# Patient Record
Sex: FEMALE | Race: WHITE
[De-identification: names, ages, dates, MRNs, and addresses within clinical notes are randomized per-mention and may not be internally consistent; named-entity substitution may affect disease eponyms.]

---

## 2017-04-12 ENCOUNTER — CHARTING TRANS (OUTPATIENT)
Dept: OTHER | Age: 47
End: 2017-04-12

## 2017-08-07 ENCOUNTER — ANESTHESIA EVENT (OUTPATIENT)
Dept: CARDIAC SURGERY | Facility: HOSPITAL | Age: 47
End: 2017-08-07
Payer: COMMERCIAL

## 2017-08-08 ENCOUNTER — HOSPITAL ENCOUNTER (OUTPATIENT)
Facility: HOSPITAL | Age: 47
Discharge: HOME | End: 2017-08-08
Attending: SURGERY | Admitting: SURGERY
Payer: COMMERCIAL

## 2017-08-08 ENCOUNTER — ANESTHESIA (OUTPATIENT)
Dept: CARDIAC SURGERY | Facility: HOSPITAL | Age: 47
End: 2017-08-08
Payer: COMMERCIAL

## 2017-08-08 ENCOUNTER — SURGERY (OUTPATIENT)
Age: 47
End: 2017-08-08

## 2017-08-08 VITALS
SYSTOLIC BLOOD PRESSURE: 94 MMHG | BODY MASS INDEX: 44.9 KG/M2 | HEART RATE: 76 BPM | RESPIRATION RATE: 16 BRPM | WEIGHT: 244 LBS | HEIGHT: 62 IN | OXYGEN SATURATION: 95 % | DIASTOLIC BLOOD PRESSURE: 50 MMHG | TEMPERATURE: 98 F

## 2017-08-08 LAB
APTT PPP: 37.3 SECONDS (ref 25–34)
ATRIAL RATE: 70 BPM
BUN BLD-MCNC: 34 MG/DL (ref 8–20)
CALCIUM BLD-MCNC: 9.7 MG/DL (ref 8.3–10.3)
CHLORIDE: 99 MMOL/L (ref 101–111)
CO2: 30 MMOL/L (ref 22–32)
CREAT BLD-MCNC: 7.27 MG/DL (ref 0.55–1.02)
GLUCOSE BLD-MCNC: 90 MG/DL (ref 70–99)
INR BLD: 1.01 (ref 0.89–1.11)
P AXIS: 36 DEGREES
P-R INTERVAL: 166 MS
POTASSIUM SERPL-SCNC: 4.5 MMOL/L (ref 3.6–5.1)
PSA SERPL DL<=0.01 NG/ML-MCNC: 13.3 SECONDS (ref 12–14.3)
Q-T INTERVAL: 396 MS
QRS DURATION: 84 MS
QTC CALCULATION (BEZET): 427 MS
R AXIS: 9 DEGREES
SODIUM SERPL-SCNC: 138 MMOL/L (ref 136–144)
T AXIS: 42 DEGREES
VENTRICULAR RATE: 70 BPM

## 2017-08-08 PROCEDURE — 93005 ELECTROCARDIOGRAM TRACING: CPT

## 2017-08-08 PROCEDURE — 93010 ELECTROCARDIOGRAM REPORT: CPT | Performed by: INTERNAL MEDICINE

## 2017-08-08 PROCEDURE — 80048 BASIC METABOLIC PNL TOTAL CA: CPT | Performed by: SURGERY

## 2017-08-08 PROCEDURE — 85730 THROMBOPLASTIN TIME PARTIAL: CPT | Performed by: SURGERY

## 2017-08-08 PROCEDURE — 05SF0ZZ REPOSITION LEFT CEPHALIC VEIN, OPEN APPROACH: ICD-10-PCS | Performed by: SURGERY

## 2017-08-08 PROCEDURE — 85610 PROTHROMBIN TIME: CPT | Performed by: SURGERY

## 2017-08-08 RX ORDER — HYDROMORPHONE HYDROCHLORIDE 1 MG/ML
0.4 INJECTION, SOLUTION INTRAMUSCULAR; INTRAVENOUS; SUBCUTANEOUS EVERY 5 MIN PRN
Status: ACTIVE | OUTPATIENT
Start: 2017-08-08 | End: 2017-08-08

## 2017-08-08 RX ORDER — ONDANSETRON 4 MG/1
4 TABLET, ORALLY DISINTEGRATING ORAL EVERY 6 HOURS PRN
Status: DISCONTINUED | OUTPATIENT
Start: 2017-08-08 | End: 2017-08-12

## 2017-08-08 RX ORDER — NALOXONE HYDROCHLORIDE 0.4 MG/ML
80 INJECTION, SOLUTION INTRAMUSCULAR; INTRAVENOUS; SUBCUTANEOUS AS NEEDED
Status: ACTIVE | OUTPATIENT
Start: 2017-08-08 | End: 2017-08-08

## 2017-08-08 RX ORDER — ONDANSETRON 2 MG/ML
INJECTION INTRAMUSCULAR; INTRAVENOUS
Status: COMPLETED
Start: 2017-08-08 | End: 2017-08-08

## 2017-08-08 RX ORDER — MORPHINE SULFATE 2 MG/ML
2 INJECTION, SOLUTION INTRAMUSCULAR; INTRAVENOUS
Status: DISCONTINUED | OUTPATIENT
Start: 2017-08-08 | End: 2017-08-12

## 2017-08-08 RX ORDER — BACITRACIN 50000 [USP'U]/1
INJECTION, POWDER, LYOPHILIZED, FOR SOLUTION INTRAMUSCULAR AS NEEDED
Status: DISCONTINUED | OUTPATIENT
Start: 2017-08-08 | End: 2017-08-08

## 2017-08-08 RX ORDER — ONDANSETRON 2 MG/ML
4 INJECTION INTRAMUSCULAR; INTRAVENOUS EVERY 6 HOURS PRN
Status: DISCONTINUED | OUTPATIENT
Start: 2017-08-08 | End: 2017-08-12

## 2017-08-08 RX ORDER — MORPHINE SULFATE 4 MG/ML
8 INJECTION, SOLUTION INTRAMUSCULAR; INTRAVENOUS
Status: DISCONTINUED | OUTPATIENT
Start: 2017-08-08 | End: 2017-08-12

## 2017-08-08 RX ORDER — HYDROCODONE BITARTRATE AND ACETAMINOPHEN 10; 325 MG/1; MG/1
1 TABLET ORAL EVERY 6 HOURS PRN
Status: DISCONTINUED | OUTPATIENT
Start: 2017-08-08 | End: 2017-08-12

## 2017-08-08 RX ORDER — MORPHINE SULFATE 4 MG/ML
4 INJECTION, SOLUTION INTRAMUSCULAR; INTRAVENOUS
Status: DISCONTINUED | OUTPATIENT
Start: 2017-08-08 | End: 2017-08-12

## 2017-08-08 RX ORDER — CEFAZOLIN SODIUM 1 G/3ML
INJECTION, POWDER, FOR SOLUTION INTRAMUSCULAR; INTRAVENOUS
Status: DISCONTINUED | OUTPATIENT
Start: 2017-08-08 | End: 2017-08-08 | Stop reason: HOSPADM

## 2017-08-08 RX ORDER — HYDROCODONE BITARTRATE AND ACETAMINOPHEN 5; 325 MG/1; MG/1
1 TABLET ORAL AS NEEDED
Status: DISCONTINUED | OUTPATIENT
Start: 2017-08-08 | End: 2017-08-12

## 2017-08-08 RX ORDER — HYDROCODONE BITARTRATE AND ACETAMINOPHEN 10; 325 MG/1; MG/1
2 TABLET ORAL EVERY 6 HOURS PRN
Status: DISCONTINUED | OUTPATIENT
Start: 2017-08-08 | End: 2017-08-12

## 2017-08-08 RX ORDER — HYDROCODONE BITARTRATE AND ACETAMINOPHEN 5; 325 MG/1; MG/1
2 TABLET ORAL AS NEEDED
Status: DISCONTINUED | OUTPATIENT
Start: 2017-08-08 | End: 2017-08-12

## 2017-08-08 RX ORDER — BUPIVACAINE HYDROCHLORIDE 5 MG/ML
INJECTION, SOLUTION PERINEURAL AS NEEDED
Status: DISCONTINUED | OUTPATIENT
Start: 2017-08-08 | End: 2017-08-08 | Stop reason: HOSPADM

## 2017-08-08 RX ORDER — ONDANSETRON 2 MG/ML
4 INJECTION INTRAMUSCULAR; INTRAVENOUS AS NEEDED
Status: ACTIVE | OUTPATIENT
Start: 2017-08-08 | End: 2017-08-08

## 2017-08-08 RX ORDER — SODIUM CHLORIDE, SODIUM LACTATE, POTASSIUM CHLORIDE, CALCIUM CHLORIDE 600; 310; 30; 20 MG/100ML; MG/100ML; MG/100ML; MG/100ML
INJECTION, SOLUTION INTRAVENOUS CONTINUOUS
Status: DISCONTINUED | OUTPATIENT
Start: 2017-08-08 | End: 2017-08-12

## 2017-08-08 RX ORDER — HYDROCODONE BITARTRATE AND ACETAMINOPHEN 10; 325 MG/1; MG/1
1 TABLET ORAL EVERY 4 HOURS PRN
Qty: 60 TABLET | Refills: 0 | Status: SHIPPED | OUTPATIENT
Start: 2017-08-08 | End: 2017-08-18

## 2017-08-08 RX ORDER — ACETAMINOPHEN 500 MG
1000 TABLET ORAL ONCE AS NEEDED
Status: ACTIVE | OUTPATIENT
Start: 2017-08-08 | End: 2017-08-08

## 2017-08-08 NOTE — H&P
[facility]  Vascular Surgery    Zeenat Greene Patient Status:  Outpatient    1970 MRN [id number]   Location [address] [facility] Attending Otilio Meigs, MD   Hosp Day # 0 PCP Zachary Roy       History of Present Illness: bruits  RESPIRATORY: no rales, rhonchi, or wheezes B  CARDIO: RRR without murmur, no murmur, no gallop   ABDOMEN: soft, non-tender with no palpable aneurysm or masses  BACK: normal, no tenderness  SKIN: no rashes, no suspicious lesions, warm and dry  EXTRE

## 2017-08-08 NOTE — ANESTHESIA POSTPROCEDURE EVALUATION
[address] [facility] Patient Status:  Outpatient   Age/Gender 56year old female MRN [id number]   Location 1310 [facility] ER Attending Mac Villalobos MD   Hosp Day # 0 PCP [facility] - SIDNEY [facility]ER[facility]       Anesthesia Post-op Note

## 2017-08-08 NOTE — BRIEF OP NOTE
Pre-Operative Diagnosis: end stage renal disease     Post-Operative Diagnosis: ESRD     Procedure Performed:   Left cephalic vein transposition - upper arm    Surgeon(s) and Role:     Roseline Calzada MD - Primary    Assistant(s):  Surgical Assistant.

## 2017-08-08 NOTE — ANESTHESIA PREPROCEDURE EVALUATION
PRE-OP EVALUATION    Patient Name: Moncho Pace    Pre-op Diagnosis: end stage renal disease    Procedure(s):  left cephalic vein transposition  Brian GILES    Surgeon(s) and Role:     Eric Barboza MD - Primary    Pre-op vitals reviewed.         Body mas regular  Rate: normal     Dental    No notable dental history.          Pulmonary    Pulmonary exam normal.                 Other findings          /60 (BP Location: Right arm)   Pulse 71   Temp (!) 97.5 °F (36.4 °C) (Temporal)   Resp 12   Ht 1.575 m

## 2017-08-18 PROBLEM — N18.6 ESRD ON HEMODIALYSIS (HCC): Status: ACTIVE | Noted: 2017-08-18

## 2017-08-18 PROBLEM — Z99.2 ESRD ON HEMODIALYSIS (HCC): Status: ACTIVE | Noted: 2017-08-18

## 2017-08-27 NOTE — OPERATIVE REPORT
Pre-Operative Diagnosis: end stage renal disease     Post-Operative Diagnosis: ESRD     Procedure Performed:   Left cephalic vein transposition - upper arm    Surgeon(s) and Role:     Benjamin Jean MD - Primary    Assistant(s):  Surgical Assistant. vein prevented me from creating a completely separate tunnel for the entire length of the vein. . Patient was then heparinized. The vein was spatulated. A 4-5 mm arteriotomy was created on the brachial artery after tightening the Vesseloops.   The anastomo

## 2017-08-27 NOTE — BRIEF OP NOTE
Pre-Operative Diagnosis: end stage renal disease     Post-Operative Diagnosis: ESRD     Procedure Performed:   Left cephalic vein transposition - upper arm    Surgeon(s) and Role:     Kaylyn Burciaga MD - Primary    Assistant(s):  Surgical Assistant. vein prevented me from creating a completely separate tunnel for the entire length of the vein. . Patient was then heparinized. The vein was spatulated. A 4-5 mm arteriotomy was created on the brachial artery after tightening the Vesseloops.   The anastomo

## 2017-10-09 RX ORDER — SODIUM BICARBONATE 650 MG/1
650 TABLET ORAL 4 TIMES DAILY
COMMUNITY
End: 2017-10-09 | Stop reason: ALTCHOICE

## 2017-10-10 ENCOUNTER — HOSPITAL ENCOUNTER (OUTPATIENT)
Dept: INTERVENTIONAL RADIOLOGY/VASCULAR | Facility: HOSPITAL | Age: 47
Discharge: HOME | End: 2017-10-10
Attending: SURGERY | Admitting: SURGERY
Payer: COMMERCIAL

## 2017-10-10 VITALS
HEIGHT: 62 IN | HEART RATE: 77 BPM | RESPIRATION RATE: 12 BRPM | WEIGHT: 250 LBS | DIASTOLIC BLOOD PRESSURE: 75 MMHG | BODY MASS INDEX: 46.01 KG/M2 | TEMPERATURE: 97 F | SYSTOLIC BLOOD PRESSURE: 156 MMHG | OXYGEN SATURATION: 100 %

## 2017-10-10 DIAGNOSIS — N18.6 END STAGE RENAL DISEASE (HCC): ICD-10-CM

## 2017-10-10 PROCEDURE — 75820 VEIN X-RAY ARM/LEG: CPT

## 2017-10-10 PROCEDURE — 85025 COMPLETE CBC W/AUTO DIFF WBC: CPT | Performed by: SURGERY

## 2017-10-10 PROCEDURE — 36005 INJECTION EXT VENOGRAPHY: CPT

## 2017-10-10 PROCEDURE — 76937 US GUIDE VASCULAR ACCESS: CPT

## 2017-10-10 PROCEDURE — 80048 BASIC METABOLIC PNL TOTAL CA: CPT | Performed by: SURGERY

## 2017-10-10 PROCEDURE — B51NYZZ FLUOROSCOPY OF LEFT UPPER EXTREMITY VEINS USING OTHER CONTRAST: ICD-10-PCS | Performed by: SURGERY

## 2017-10-10 PROCEDURE — B54NZZA ULTRASONOGRAPHY OF LEFT UPPER EXTREMITY VEINS, GUIDANCE: ICD-10-PCS | Performed by: SURGERY

## 2017-10-10 RX ORDER — HEPARIN SODIUM 5000 [USP'U]/ML
INJECTION, SOLUTION INTRAVENOUS; SUBCUTANEOUS
Status: COMPLETED
Start: 2017-10-10 | End: 2017-10-10

## 2017-10-10 RX ORDER — LIDOCAINE HYDROCHLORIDE 10 MG/ML
INJECTION, SOLUTION INFILTRATION; PERINEURAL
Status: COMPLETED
Start: 2017-10-10 | End: 2017-10-10

## 2017-10-10 RX ORDER — SODIUM CHLORIDE 9 MG/ML
INJECTION, SOLUTION INTRAVENOUS CONTINUOUS
Status: DISCONTINUED | OUTPATIENT
Start: 2017-10-10 | End: 2017-10-10

## 2017-10-10 RX ORDER — MIDAZOLAM HYDROCHLORIDE 1 MG/ML
INJECTION INTRAMUSCULAR; INTRAVENOUS
Status: DISCONTINUED
Start: 2017-10-10 | End: 2017-10-10 | Stop reason: WASHOUT

## 2017-10-10 NOTE — BRIEF OP NOTE
Pre-Operative Diagnosis: ESRD     Post-Operative Diagnosis: ESRD     Procedure Performed:   1. Ultrasound-guided percutaneous access left brachial vein  2. Left arm venogram  3.   Attempted access right brachial vein unsuccessful there appears to be mu

## 2017-10-10 NOTE — PROGRESS NOTES
Discharge instructions reviewed w pt.no questions. Pt iv d/cd. Discharged to home in stable condition.

## 2017-11-11 NOTE — H&P
[facility]  Vascular Surgery     Carleen Velez Patient Status:  Outpatient in a Bed    1970 MRN [id number]   Location [address] [facility] Attending No att. providers found   [facility] Day # 0 PCP ROCCO VIERA     History of Clint rhonchi, or wheezes B  CARDIO: RRR without murmur, no murmur, no gallop   ABDOMEN: soft, non-tender with no palpable aneurysm or masses  BACK: normal, no tenderness  SKIN: no rashes, no suspicious lesions, warm and dry  EXTREMITIES: no edema, full range of

## 2017-11-13 NOTE — OPERATIVE REPORT
Pre-Operative Diagnosis: ESRD     Post-Operative Diagnosis: ESRD     Procedure Performed:   1. Ultrasound-guided percutaneous access left brachial vein  2. Left arm venogram  3.   Attempted access right brachial vein unsuccessful there appears to be mu devices were removed and the patient was taken to recovery.         Keisha Vail MD  10/10/2017  3:08 PM

## 2017-12-13 ENCOUNTER — ANESTHESIA EVENT (OUTPATIENT)
Dept: CARDIAC SURGERY | Facility: HOSPITAL | Age: 47
End: 2017-12-13
Payer: COMMERCIAL

## 2017-12-14 ENCOUNTER — HOSPITAL ENCOUNTER (OUTPATIENT)
Facility: HOSPITAL | Age: 47
Setting detail: HOSPITAL OUTPATIENT SURGERY
Discharge: HOME | End: 2017-12-14
Attending: SURGERY | Admitting: SURGERY
Payer: COMMERCIAL

## 2017-12-14 ENCOUNTER — ANESTHESIA (OUTPATIENT)
Dept: CARDIAC SURGERY | Facility: HOSPITAL | Age: 47
End: 2017-12-14
Payer: COMMERCIAL

## 2017-12-14 ENCOUNTER — SURGERY (OUTPATIENT)
Age: 47
End: 2017-12-14

## 2017-12-14 VITALS
DIASTOLIC BLOOD PRESSURE: 66 MMHG | SYSTOLIC BLOOD PRESSURE: 109 MMHG | HEIGHT: 62 IN | OXYGEN SATURATION: 100 % | HEART RATE: 56 BPM | TEMPERATURE: 98 F | BODY MASS INDEX: 44.4 KG/M2 | RESPIRATION RATE: 18 BRPM | WEIGHT: 241.25 LBS

## 2017-12-14 PROCEDURE — 05SC0ZZ REPOSITION LEFT BASILIC VEIN, OPEN APPROACH: ICD-10-PCS | Performed by: SURGERY

## 2017-12-14 PROCEDURE — 80053 COMPREHEN METABOLIC PANEL: CPT | Performed by: SURGERY

## 2017-12-14 PROCEDURE — 85025 COMPLETE CBC W/AUTO DIFF WBC: CPT | Performed by: SURGERY

## 2017-12-14 RX ORDER — HYDROCODONE BITARTRATE AND ACETAMINOPHEN 5; 325 MG/1; MG/1
1 TABLET ORAL AS NEEDED
Status: DISCONTINUED | OUTPATIENT
Start: 2017-12-14 | End: 2017-12-14

## 2017-12-14 RX ORDER — NALOXONE HYDROCHLORIDE 0.4 MG/ML
80 INJECTION, SOLUTION INTRAMUSCULAR; INTRAVENOUS; SUBCUTANEOUS AS NEEDED
Status: DISCONTINUED | OUTPATIENT
Start: 2017-12-14 | End: 2017-12-14

## 2017-12-14 RX ORDER — HYDROMORPHONE HYDROCHLORIDE 1 MG/ML
0.4 INJECTION, SOLUTION INTRAMUSCULAR; INTRAVENOUS; SUBCUTANEOUS EVERY 5 MIN PRN
Status: DISCONTINUED | OUTPATIENT
Start: 2017-12-14 | End: 2017-12-14

## 2017-12-14 RX ORDER — CEFAZOLIN SODIUM 1 G/3ML
INJECTION, POWDER, FOR SOLUTION INTRAMUSCULAR; INTRAVENOUS
Status: DISCONTINUED | OUTPATIENT
Start: 2017-12-14 | End: 2017-12-14 | Stop reason: HOSPADM

## 2017-12-14 RX ORDER — MIDAZOLAM HYDROCHLORIDE 1 MG/ML
1 INJECTION INTRAMUSCULAR; INTRAVENOUS EVERY 5 MIN PRN
Status: DISCONTINUED | OUTPATIENT
Start: 2017-12-14 | End: 2017-12-14

## 2017-12-14 RX ORDER — MEPERIDINE HYDROCHLORIDE 25 MG/ML
12.5 INJECTION INTRAMUSCULAR; INTRAVENOUS; SUBCUTANEOUS AS NEEDED
Status: DISCONTINUED | OUTPATIENT
Start: 2017-12-14 | End: 2017-12-14

## 2017-12-14 RX ORDER — ONDANSETRON 2 MG/ML
4 INJECTION INTRAMUSCULAR; INTRAVENOUS AS NEEDED
Status: DISCONTINUED | OUTPATIENT
Start: 2017-12-14 | End: 2017-12-14

## 2017-12-14 RX ORDER — HYDROCODONE BITARTRATE AND ACETAMINOPHEN 10; 325 MG/1; MG/1
1 TABLET ORAL EVERY 4 HOURS PRN
Qty: 30 TABLET | Refills: 0 | Status: SHIPPED | OUTPATIENT
Start: 2017-12-14

## 2017-12-14 RX ORDER — ONDANSETRON 2 MG/ML
INJECTION INTRAMUSCULAR; INTRAVENOUS
Status: COMPLETED
Start: 2017-12-14 | End: 2017-12-14

## 2017-12-14 RX ORDER — HYDROCODONE BITARTRATE AND ACETAMINOPHEN 5; 325 MG/1; MG/1
2 TABLET ORAL AS NEEDED
Status: DISCONTINUED | OUTPATIENT
Start: 2017-12-14 | End: 2017-12-14

## 2017-12-14 RX ORDER — BUPIVACAINE HYDROCHLORIDE 5 MG/ML
INJECTION, SOLUTION PERINEURAL AS NEEDED
Status: DISCONTINUED | OUTPATIENT
Start: 2017-12-14 | End: 2017-12-14 | Stop reason: HOSPADM

## 2017-12-14 RX ORDER — SODIUM CHLORIDE, SODIUM LACTATE, POTASSIUM CHLORIDE, CALCIUM CHLORIDE 600; 310; 30; 20 MG/100ML; MG/100ML; MG/100ML; MG/100ML
INJECTION, SOLUTION INTRAVENOUS CONTINUOUS
Status: DISCONTINUED | OUTPATIENT
Start: 2017-12-14 | End: 2017-12-14

## 2017-12-14 NOTE — BRIEF OP NOTE
Pre-Operative Diagnosis: end stage renal disease     Post-Operative Diagnosis: end stage renal disease     Procedure Performed:   Left upper extremity basilic vein transposition    Surgeon(s) and Role:     Phong Vicente MD - Primary    Assistant(s)

## 2017-12-14 NOTE — OPERATIVE REPORT
Pre-Operative Diagnosis: end stage renal disease     Post-Operative Diagnosis: end stage renal disease     Procedure Performed:   Left upper extremity basilic vein transposition    Surgeon(s) and Role:     Cynthia Samano MD - Primary    Assistant(s) divided. I made a separate incision just below the elbow crease and dissected out the connected median antecubital vein and cephalic vein to the point where the cephalic vein became sclerotic.   I then made a transverse incision just above the elbow and di

## 2017-12-14 NOTE — ANESTHESIA PREPROCEDURE EVALUATION
PRE-OP EVALUATION    Patient Name: Dilcia Malone    Pre-op Diagnosis: end stage renal disease    Procedure(s):  elft arteriovenous fistula graft  SA pending    Surgeon(s) and Role:     Peg Ortiz MD - Primary    Pre-op vitals reviewed.         Body 10/10/2017   MCH 33.6 (H) 10/10/2017   MCHC 33.2 10/10/2017   RDW 13.5 10/10/2017   .0 10/10/2017       Lab Results  Component Value Date    (L) 10/10/2017   K 4.3 10/10/2017   CL 98 (L) 10/10/2017   CO2 25.0 10/10/2017   BUN 32 (H) 10/10/2017

## 2017-12-14 NOTE — ANESTHESIA POSTPROCEDURE EVALUATION
[address] [facility] Patient Status:  Hospital Outpatient Surgery   Age/Gender 56year old female MRN [id number]   Location [address] [facility] Attending Lucille Hebert MD   Hosp Day # 0 PCP MD Capo Posey

## 2018-01-14 NOTE — H&P
[facility]  Vascular Surgery     Geradine Certain Patient Status:  Hospital Outpatient Surgery    1970 MRN [id number]   Location [address] [place] [address] Attending No att. providers found   Hosp Day # 0 PCP BRUNO Small[place] (1.575 m)   Wt 241 lb 4 oz (109.4 kg)   LMP 02/02/2016   SpO2 100%   BMI 44.13 kg/m²   GENERAL: alert and orientated X 3, well developed, well nourished, in no apparent distress  HEENT: ears and throat are clear  NECK: supple, no lymphadenopathy, thyroid w

## 2018-11-03 VITALS
BODY MASS INDEX: 38.95 KG/M2 | WEIGHT: 257 LBS | RESPIRATION RATE: 16 BRPM | HEART RATE: 76 BPM | SYSTOLIC BLOOD PRESSURE: 133 MMHG | DIASTOLIC BLOOD PRESSURE: 77 MMHG | TEMPERATURE: 97.9 F | HEIGHT: 68 IN

## (undated) DEVICE — GOWN SURG AERO CHROME XXL

## (undated) DEVICE — SUTURE VICRYL 3-0 SH

## (undated) DEVICE — KENDALL SCD EXPRESS SLEEVES, KNEE LENGTH, MEDIUM: Brand: KENDALL SCD

## (undated) DEVICE — SUTURE MONOCRYL 4-0 PS-2

## (undated) DEVICE — SUTURE PROLENE 6-0 BV-1

## (undated) DEVICE — CV PACK-LF: Brand: MEDLINE INDUSTRIES, INC.

## (undated) DEVICE — STERILE POLYISOPRENE POWDER-FREE SURGICAL GLOVES: Brand: PROTEXIS

## (undated) DEVICE — ARISTA AH ABSORBABLE HEMOSTATIC PARTICLES: Brand: ARISTA™ AH

## (undated) DEVICE — GLOVE BIOGEL M SURG SZ 71/2

## (undated) DEVICE — BASIC DOUBLE BASIN 1-LF: Brand: MEDLINE INDUSTRIES, INC.

## (undated) DEVICE — DRAPE,EXTREMITY,89X128,STERILE: Brand: MEDLINE

## (undated) DEVICE — SOL  .9 1000ML BTL

## (undated) DEVICE — PROXIMATE RH ROTATING HEAD SKIN STAPLERS (35 WIDE) CONTAINS 35 STAINLESS STEEL STAPLES: Brand: PROXIMATE

## (undated) DEVICE — DERMABOND LIQUID ADHESIVE

## (undated) DEVICE — CHLORAPREP 26ML APPLICATOR

## (undated) DEVICE — TRANSPOSAL ULTRAFLEX DUO/QUAD ULTRA CART MANIFOLD

## (undated) DEVICE — SOL  .9 500ML

## (undated) DEVICE — 3M™ MEDIPORE™ + PAD 3573: Brand: 3M™ MEDIPORE™

## (undated) DEVICE — DECANTER BAG 9": Brand: MEDLINE INDUSTRIES, INC.

## (undated) DEVICE — SUTURE SILK 3-0

## (undated) DEVICE — VIOLET BRAIDED (POLYGLACTIN 910), SYNTHETIC ABSORBABLE SUTURE: Brand: COATED VICRYL

## (undated) DEVICE — HEMOCLIP HORIZON SM MULTI

## (undated) DEVICE — INDICATED FOR SURGICAL CLAMPING DURING CARDIOVASCULAR PERIPHERAL VASCULAR, AND GENERAL SURGERY.: Brand: SOFT/FIBRA® SPRING CLIP

## (undated) DEVICE — SYRINGE 20CC LL TIP

## (undated) DEVICE — INTENDED TO BE USED TO OCCLUDE, RETRACT AND IDENTIFY ARTERIES, VEINS, TENDONS AND NERVES IN SURGICAL PROCEDURES: Brand: STERION®  VESSEL LOOP

## (undated) DEVICE — GEL AQUASONIC 100 20GR

## (undated) DEVICE — SUTURE PROLENE 5-0 C-1

## (undated) DEVICE — SUTURE PROLENE 7-0 BV-1

## (undated) DEVICE — ABSORBABLE HEMOSTAT (OXIDIZED REGENERATED CELLULOSE, U.S.P.): Brand: SURGICEL

## (undated) NOTE — LETTER
[facility]  Adan Conway 62 [address] [facility], [address] [place] [address]    Consent for Operation    Date: __________________    Time: _______________    1.  I authorize the performance upon Jeanette Lyons the following operation:      left arteriovenous fistula graft cr videotape. [facility] will not be responsible for storage or maintenance of this tape. 6. For the purpose of advancing medical education, I consent to the admittance of observers to the Operating Room.     7. I authorize the use of any specimen, organs Signature of Patient:   ___________________________    When the patient is a minor or mentally incompetent to give consent:  Signature of person authorized to consent for patient: ___________________________   Relationship to patient: _____________________ drugs/illegal medications). Failure to inform my anesthesiologist about these medicines may increase my risk of anesthetic complications. · If I am allergic to anything or have had a reaction to anesthesia before.     3. I understand how the anesthesia med I have discussed the procedure and information above with the patient (or patient’s representative) and answered their questions. The patient or their representative has agreed to have anesthesia services.     _______________________________________________

## (undated) NOTE — LETTER
[facility]  Adan Brennendemario 62 [address] [facility], [address] [place] [address]    Consent for Operation    Date: __________________    Time: _______________    1.  I authorize the performance upon Chai Salvador the following operation:      left cephalic vein transposition videotape. [facility] will not be responsible for storage or maintenance of this tape. 6. For the purpose of advancing medical education, I consent to the admittance of observers to the Operating Room.     7. I authorize the use of any specimen, organs Signature of Patient:   ___________________________    When the patient is a minor or mentally incompetent to give consent:  Signature of person authorized to consent for patient: ___________________________   Relationship to patient: _____________________ these medicines may increase my risk of anesthetic complications. · If I am allergic to anything or have had a reaction to anesthesia before. 3. I understand how the anesthesia medicine will help me (benefits).     4. I understand that with any type of patient’s representative) and answered their questions. The patient or their representative has agreed to have anesthesia services.     _____________________________________________________________________________  Witness        Date   Time  I have margaret

## (undated) NOTE — LETTER
[facility] [address], [address]  Consent for Procedure/Sedation    Date:        Time:       1.  I authorize the performance upon Margejael Given the following:  VENOGRAM      2. I authorize Dr. Ethel Guy (and whomever is designated as ________________________________    ___________________    Witness: _________________________      Date: ___________________    Printed: 10/9/2017   11:56 AM  Patient Name: Triston Paz        : 1970       Medical Record #: [id number]